# Patient Record
Sex: FEMALE | Race: WHITE | URBAN - METROPOLITAN AREA
[De-identification: names, ages, dates, MRNs, and addresses within clinical notes are randomized per-mention and may not be internally consistent; named-entity substitution may affect disease eponyms.]

---

## 2019-07-24 ENCOUNTER — OFFICE VISIT (OUTPATIENT)
Dept: FAMILY MEDICINE | Facility: CLINIC | Age: 69
End: 2019-07-24
Payer: COMMERCIAL

## 2019-07-24 VITALS
HEART RATE: 75 BPM | DIASTOLIC BLOOD PRESSURE: 85 MMHG | SYSTOLIC BLOOD PRESSURE: 146 MMHG | TEMPERATURE: 98.6 F | WEIGHT: 205 LBS | OXYGEN SATURATION: 98 % | BODY MASS INDEX: 37.73 KG/M2 | HEIGHT: 62 IN

## 2019-07-24 DIAGNOSIS — I10 HYPERTENSION, GOAL BELOW 140/90: ICD-10-CM

## 2019-07-24 DIAGNOSIS — F17.200 SMOKER: ICD-10-CM

## 2019-07-24 DIAGNOSIS — K21.9 GASTROESOPHAGEAL REFLUX DISEASE, ESOPHAGITIS PRESENCE NOT SPECIFIED: ICD-10-CM

## 2019-07-24 DIAGNOSIS — Z86.19 HISTORY OF HEPATITIS C: ICD-10-CM

## 2019-07-24 DIAGNOSIS — J44.9 CHRONIC OBSTRUCTIVE PULMONARY DISEASE, UNSPECIFIED COPD TYPE (H): ICD-10-CM

## 2019-07-24 DIAGNOSIS — E03.9 HYPOTHYROIDISM, UNSPECIFIED TYPE: ICD-10-CM

## 2019-07-24 DIAGNOSIS — G89.4 CHRONIC PAIN SYNDROME: Primary | ICD-10-CM

## 2019-07-24 DIAGNOSIS — E66.01 MORBID OBESITY (H): ICD-10-CM

## 2019-07-24 DIAGNOSIS — E78.5 HYPERLIPIDEMIA LDL GOAL <100: ICD-10-CM

## 2019-07-24 DIAGNOSIS — N39.3 SUI (STRESS URINARY INCONTINENCE, FEMALE): ICD-10-CM

## 2019-07-24 DIAGNOSIS — Z12.11 SCREEN FOR COLON CANCER: ICD-10-CM

## 2019-07-24 DIAGNOSIS — Z12.39 BREAST CANCER SCREENING: ICD-10-CM

## 2019-07-24 PROCEDURE — 99204 OFFICE O/P NEW MOD 45 MIN: CPT | Performed by: FAMILY MEDICINE

## 2019-07-24 RX ORDER — GLUCOSAM/CHOND-MSM 2/C/D3/MANG 750MG-30MG
TABLET ORAL
COMMUNITY
Start: 2019-01-03

## 2019-07-24 RX ORDER — HYDROCODONE BITARTRATE AND ACETAMINOPHEN 5; 325 MG/1; MG/1
1 TABLET ORAL PRN
Status: CANCELLED | OUTPATIENT
Start: 2019-07-24

## 2019-07-24 RX ORDER — OXYBUTYNIN CHLORIDE 5 MG/1
5 TABLET ORAL DAILY
Refills: 3 | COMMUNITY
Start: 2019-06-29

## 2019-07-24 RX ORDER — CALCIUM CARBONATE 500(1250)
TABLET ORAL
COMMUNITY
Start: 2019-01-03

## 2019-07-24 RX ORDER — MULTIVITAMIN WITH FOLIC ACID 400 MCG
1 TABLET ORAL DAILY
Refills: 3 | COMMUNITY
Start: 2019-07-10

## 2019-07-24 RX ORDER — GABAPENTIN 300 MG/1
CAPSULE ORAL
Refills: 2 | COMMUNITY
Start: 2019-06-29

## 2019-07-24 RX ORDER — IPRATROPIUM BROMIDE 17 UG/1
AEROSOL, METERED RESPIRATORY (INHALATION)
Refills: 4 | COMMUNITY
Start: 2019-06-29

## 2019-07-24 RX ORDER — BIOTIN 10 MG
20 TABLET ORAL DAILY
COMMUNITY
Start: 2019-01-03

## 2019-07-24 RX ORDER — TRIAMTERENE AND HYDROCHLOROTHIAZIDE 37.5; 25 MG/1; MG/1
CAPSULE ORAL
COMMUNITY
Start: 2019-01-03

## 2019-07-24 RX ORDER — ACETAMINOPHEN 160 MG
TABLET,DISINTEGRATING ORAL
Refills: 3 | COMMUNITY
Start: 2019-06-29

## 2019-07-24 RX ORDER — FLUTICASONE PROPIONATE AND SALMETEROL XINAFOATE 115; 21 UG/1; UG/1
AEROSOL, METERED RESPIRATORY (INHALATION)
Refills: 4 | COMMUNITY
Start: 2019-06-29

## 2019-07-24 ASSESSMENT — MIFFLIN-ST. JEOR: SCORE: 1418.12

## 2019-07-24 NOTE — PROGRESS NOTES
Subjective     Yvonne Cantu is a 68 year old female who presents to clinic today for the following health issues:    HPI   New Patient/Transfer of Care  She has increased her tizanidine to 3 times a day due to a left hip pain.  Refill of NORCO. Last fill was 6/27/19. Pain meds sent to [facility].  Needs FIT  Last mammogram 5/2/18 at [facility]    She has been receiving care through the [facility] system for a number of years.  She has multiple chronic pain issues and has been on opioids for years.  She is looking to start receiving care from our clinic.  When asked her why, she stated it was because our clinic is closer to where she lives and she does not want to drive up to [employer].  She also stated that I take care of a neighbor of hers and the neighbor speaks highly of my care.  When I looked at her chart via care everywhere, I saw that she recently failed her urine drug test.  There was no evidence that she was taking her narcotics.  Her PCP there told her that she would no longer prescribe narcotics to the patient.  When I pressed the patient on this, she acknowledged that is one of the main reasons she is here to see me today.  I spent a good deal of time reviewing her old chart and see that she does have COPD with ongoing tobacco abuse.  She is hypothyroid.  She has a history of hyperlipidemia and hepatitis C and hypertension and GERD.  She is previously received care for her chronic pain through [facility].  She has gone through injections with them and they talked about providing a pain pump for her.    Patient Active Problem List   Diagnosis     Hypothyroidism     Allergic rhinitis     Hypertension, goal below 140/90     Hyperlipidemia LDL goal <100     Gastroesophageal reflux disease, esophagitis presence not specified     Chronic obstructive pulmonary disease, unspecified COPD type (H)     Smoker     AMAURI (stress urinary incontinence, female)     History of hepatitis C     Past Surgical History:    Procedure Laterality Date     ABDOMEN SURGERY       CHOLECYSTECTOMY       GYN SURGERY  1997    Hysterectomy       Social History     Tobacco Use     Smoking status: Current Every Day Smoker     Smokeless tobacco: Never Used   Substance Use Topics     Alcohol use: Yes     Family History   Problem Relation Age of Onset     Cerebrovascular Disease Mother      Diabetes Mother      Asthma Mother      Hypertension Mother      Coronary Artery Disease Father      Hyperlipidemia Sister          Current Outpatient Medications   Medication Sig Dispense Refill     ADVAIR -21 MCG/ACT inhaler INHALE 1 PUFF BY MOUTH EVERY 12 HOURS  4     albuterol (2.5 MG/3ML) 0.083% nebulizer solution Take 1 vial by nebulization as needed for shortness of breath / dyspnea or wheezing       ATROVENT HFA 17 MCG/ACT inhaler INHALE 1 PUFF BY MOUTH 4 TIMES DAILY  4     Biotin 10 MG TABS tablet Take 20 mg by mouth daily       calcium carbonate (OS-GLENNY) 500 MG TABS Take 1 tablet by mouth TID with a meal.       Cholecalciferol (VITAMIN D3) 2000 units CAPS TAKE 1 CAPSULE BY MOUTH ONCE DAILY  3     fluticasone (FLONASE) 50 MCG/ACT nasal spray Spray 2 sprays into both nostrils daily 1 Package 11     gabapentin (NEURONTIN) 300 MG capsule TAKE 1 CAPSULE BY MOUTH THREE TIMES DAILY  2     HYDROcodone-acetaminophen (NORCO) 5-325 MG per tablet Take 1 tablet by mouth as needed for moderate to severe pain       ketotifen (ZADITOR) 0.025 % SOLN Place 1 drop into both eyes every 12 hours 1 Bottle 0     levothyroxine (SYNTHROID, LEVOTHROID) 50 MCG tablet Take 1 tablet (50 mcg) by mouth daily 30 tablet 5     liothyronine (CYTOMEL) 5 MCG tablet Take 1 tablet (5 mcg) by mouth daily 30 tablet 1     loratadine (CLARITIN) 10 MG tablet Take 10 mg by mouth daily       Misc Natural Products (RA GLUCOSAMINE-CHONDROIT-MSM-D) TABS Take 1 by mouth BID.       Multiple Vitamin (TAB-A-NABIL) TABS Take 1 tablet by mouth daily  3     naproxen (NAPROSYN) 500 MG tablet Take 1  "tablet (500 mg) by mouth 2 times daily (with meals) 60 tablet 1     omeprazole 20 MG tablet Take 20 mg by mouth daily        oxybutynin (DITROPAN) 5 MG tablet Take 5 mg by mouth daily  3     tiZANidine (ZANAFLEX) 4 MG capsule Take by mouth 2 times daily       triamterene-HCTZ (DYAZIDE) 37.5-25 MG capsule TAKE 1 CAPSULE EVERY MORNING       Allergies   Allergen Reactions     Atenolol Cough     Bees      Bupropion Other (See Comments)     Irbesartan Diarrhea     Lisinopril Cough         Reviewed and updated as needed this visit by Provider         Review of Systems   Mainly significant for the above items.      Objective    /85 (BP Location: Right arm, Patient Position: Chair, Cuff Size: Adult Large)   Pulse 75   Temp 98.6  F (37  C) (Oral)   Ht 1.575 m (5' 2\")   Wt 93 kg (205 lb)   SpO2 98%   BMI 37.49 kg/m    Body mass index is 37.49 kg/m .  Physical Exam   GENERAL: alert, no distress and obese    Diagnostic Test Results:  Labs reviewed in Epic        Assessment & Plan       ICD-10-CM    1. Chronic pain syndrome G89.4    2. Hypothyroidism, unspecified type E03.9    3. Hypertension, goal below 140/90 I10    4. Morbid obesity (H) E66.01    5. Chronic obstructive pulmonary disease, unspecified COPD type (H) J44.9    6. Screen for colon cancer Z12.11 Fecal colorectal cancer screen FIT - Future (S+30)   7. Breast cancer screening Z12.31 MA SCREENING DIGITAL BILAT - Future  (s+30)   8. Hyperlipidemia LDL goal <100 E78.5    9. Gastroesophageal reflux disease, esophagitis presence not specified K21.9    10. Smoker F17.200    11. AMAURI (stress urinary incontinence, female) N39.3    12. History of hepatitis C Z86.19         Tobacco Cessation:   reports that she has been smoking.  She has never used smokeless tobacco.      BMI:   Estimated body mass index is 37.49 kg/m  as calculated from the following:    Height as of this encounter: 1.575 m (5' 2\").    Weight as of this encounter: 93 kg (205 lb).     It appears the " patient is here primarily to get a new prescription of opioids from me  She frankly was not forthcoming about her failing the urine drug test at her [facility] clinic  I explained that even under ideal circumstances it would be difficult for me just to resume her same narcotic regimen, but I do not feel comfortable doing so with the failed urine drug screen  I advised her to return to a pain clinic for treatment of her chronic pain, likely [facility] in her case  If she truly wants to come here for the rest of her primary care, then she can certainly do so  She declines a colonoscopy, but verbalizes willingness to do a stool FIT test and a screening mammogram, so those orders were placed    She states that she has enough of her regular medications with refills to last her through the end of the year  I suggested a follow-up with me/us in a few months to address other primary care issues if she would like to come here for her care but    Return in about 3 months (around 10/24/2019) for BP Recheck, med check.    40 minute visit with over half the time spent counseling and/or coordinating care and reviewing her old records from [facility] via Care Everywhere.      Jhonatan Barone MD  [facility]

## 2019-08-21 ENCOUNTER — TELEPHONE (OUTPATIENT)
Dept: FAMILY MEDICINE | Facility: CLINIC | Age: 69
End: 2019-08-21

## 2019-08-21 NOTE — TELEPHONE ENCOUNTER
Panel Management Review      Patient has the following on her problem list:     Hypertension   Last three blood pressure readings:  BP Readings from Last 3 Encounters:   07/24/19 146/85   05/07/15 126/66     Blood pressure: FAILED    HTN Guidelines:  Less than 140/90      Composite cancer screening  Chart review shows that this patient is due/due soon for the following Mammogram and Fecal Colorectal (FIT)  Summary:    Patient is due/failing the following:   BP CHECK, FIT and MAMMOGRAM    Action needed:   FIT, Mammogram, BP recheck    Type of outreach:    Phone, spoke to patient.  Shirin has FIT at home and will complete and mail back in, She declined to schedule mammogram due to she has other things going on right now.    Questions for provider review:    None                                                                                                                                    Sapphire Hernandez [facility]        Chart routed to Care Team .

## 2019-11-25 ENCOUNTER — TELEPHONE (OUTPATIENT)
Dept: FAMILY MEDICINE | Facility: CLINIC | Age: 69
End: 2019-11-25

## 2019-11-25 NOTE — LETTER
November 25, 2019    Yvonne Cantu  [address]  [place] [address]    Dear Shirin    We care about your health and have reviewed your health plan. We have reviewed your medical conditions, medication list, and lab results and are making recommendations based on this review, to better manage your health.    You are in particular need of attention regarding:  - Scheduling a Breast Cancer Screening (Mammography) [phone]  - Completing a Colon Cancer Screening (FIT) - Please complete FIT test that you received on 7/24/19 and mail completed test to clinic.  - Blood pressure   -Physical    Here is a list of Health Maintenance topics that are due now or due soon:  Health Maintenance Due   Topic Date Due     DEXA  1950     SPIROMETRY  1950     URINE DRUG SCREEN  1950     ADVANCE CARE PLANNING  1950     COPD ACTION PLAN  1950     MAMMO SCREENING  1950     FIT  12/08/1961     LIPID  12/08/1996     ZOSTER IMMUNIZATION (2 of 3) 05/31/2016     MEDICARE ANNUAL WELLNESS VISIT  12/08/2016     FALL RISK ASSESSMENT  12/08/2016     PNEUMOCOCCAL IMMUNIZATION 65+ LOW/MEDIUM RISK (2 of 2 - PPSV23) 12/28/2018     PHQ-2  01/01/2019     INFLUENZA VACCINE (1) 09/01/2019       Please call us at [phone] (or use [facility]) to address the above recommendations. If we do not hear from you in the next couple of weeks we will be reaching out to you again.    Thank you for trusting [facility] and we appreciate the opportunity to serve you.  We look forward to supporting your healthcare needs in the future.    Healthy Regards,    Dr. Barone/clemencia

## 2019-11-25 NOTE — TELEPHONE ENCOUNTER
Panel Management Review      Patient has the following on her problem list:     Hypertension   Last three blood pressure readings:  BP Readings from Last 3 Encounters:   07/24/19 146/85   05/07/15 126/66     Blood pressure: FAILED    HTN Guidelines:  Less than 140/90      Composite cancer screening  Chart review shows that this patient is due/due soon for the following Mammogram and Fecal Colorectal (FIT)  Summary:    Patient is due/failing the following:   BP CHECK, FIT and MAMMOGRAM    Action needed:   Patient needs office visit for BP, Mammogram, FIT.    Type of outreach:    Sent letter.    Questions for provider review:    None                                                                                                                                    Sapphire Hernandez [facility]        Chart routed to Care Team.

## 2019-12-02 NOTE — TELEPHONE ENCOUNTER
Panel Management Review  Summary:    Type of outreach:    Phone, spoke to patient.  Shirin states that she is being seen at [facility] by Rohini Chang. She hasn't completed FIT or Mammogram. Unsure where she will be seen when she gets her new insurance.    Encounter routed to Care Team.                                                                                                                               Sapphire Hernandez CMA

## 2020-02-16 ENCOUNTER — HEALTH MAINTENANCE LETTER (OUTPATIENT)
Age: 70
End: 2020-02-16

## 2022-02-17 PROBLEM — K21.9 GASTROESOPHAGEAL REFLUX DISEASE: Status: ACTIVE | Noted: 2019-07-24
